# Patient Record
Sex: FEMALE | Race: WHITE | NOT HISPANIC OR LATINO | URBAN - METROPOLITAN AREA
[De-identification: names, ages, dates, MRNs, and addresses within clinical notes are randomized per-mention and may not be internally consistent; named-entity substitution may affect disease eponyms.]

---

## 2024-08-05 ENCOUNTER — OFFICE VISIT (OUTPATIENT)
Dept: DERMATOLOGY | Facility: IMAGING CENTER | Age: 69
End: 2024-08-05
Payer: MEDICARE

## 2024-08-05 DIAGNOSIS — L30.9 DERMATITIS: ICD-10-CM

## 2024-08-05 PROCEDURE — 11104 PUNCH BX SKIN SINGLE LESION: CPT | Performed by: STUDENT IN AN ORGANIZED HEALTH CARE EDUCATION/TRAINING PROGRAM

## 2024-08-05 PROCEDURE — 99203 OFFICE O/P NEW LOW 30 MIN: CPT | Mod: 25 | Performed by: STUDENT IN AN ORGANIZED HEALTH CARE EDUCATION/TRAINING PROGRAM

## 2024-08-05 NOTE — PROGRESS NOTES
[facility] Dermatology Clinic Note    CC: rash      HPI:  Kaylyn Canchola is a 69 y.o. female who presents today as new patient for evaluation and management of    Rash:   Notes pink bumps that popped up around March or April of this year, coming and going in different locations including face, shoulders, arms, legs. She has seen a few other dermatologists, was told to use topical steroids which didn't help. Then was told it might be eczema and recommended gentle skin care. This has not seemed to help. Spots are maybe slightly itchy, not significantly symptomatic. Doesn't take any other medications. Interested in holistic treatments.     Denies new symptoms like cough, fever, fatigue, other medical problems.       ROS: no fevers/chills. Other pertinent positives and negatives as above.     Meds/PMH/PSH/FamHx/Allergies:   I have reviewed past medical history, surgical history, medications family history, allergies relevant to my specialty in the chart.       PHYSICAL EXAM:   A focused skin exam was completed of the face, shoulders, back, chest, arms, legs with the following pertinent findings listed below.   Remaining above-listed examined areas within normal limits / negative for rashes or lesions.    Annular pink scaly plaques on the bilateral shoulders, dorsum of nose, buttocks, right leg          IMPRESSION / PLAN:    Rash NOS - ddx GA vs annular psoriasis vs EAC vs sarcoid vs other   - discussed that biopsy will give us further answers on etiology and treatment options.   - after discussion of risks benefits and alternatives, will proceed with with skin biopsy today, see procedure note  - will follow up with treatment recommendations pending biopsy results     Punch Biopsy Procedure Note:  Procedure: Biopsy by punch technique  Number of biopsies performed:   Size: 4mm   Location: shoulder   Differential diagnosis: as discussed above   Risks, benefits and alternatives of procedure discussed, verbal consent obtained  for photo (see chart) and written informed consent obtained for procedure. Time out completed. Area of biopsy prepped with alcohol. Anesthesia with 1% lidocaine with epinephrine administered intradermally with a 30 gauge needle. Punch biopsy of site performed. Hemostasis achieved with pressure and 4.0 prolene sutures. Vaseline applied to wound with bandage. Patient tolerated procedure well, and there were no complications.  The pathology specimen was sent to the lab via the staff.  Wound care was discussed with the patient. Please call clinic if post procedure complications such as bleeding at home, concern for infection.         Follow up:  pending biopsy results        Erica Gamez MD   [facility]

## 2024-08-08 ENCOUNTER — TELEPHONE (OUTPATIENT)
Dept: DERMATOLOGY | Facility: IMAGING CENTER | Age: 69
End: 2024-08-08
Payer: COMMERCIAL

## 2024-08-08 DIAGNOSIS — L92.0 GRANULOMA ANNULARE: ICD-10-CM

## 2024-08-08 NOTE — TELEPHONE ENCOUNTER
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     Options for treatment include topical steroids, intralesional steroids, NBUVB, systemic like hydroxychloroquine. Discussed risks and benefits of each. She is going to think about options and call back to office when she has decided.

## 2025-01-06 ENCOUNTER — OFFICE VISIT (OUTPATIENT)
Dept: DERMATOLOGY | Facility: IMAGING CENTER | Age: 70
End: 2025-01-06
Payer: MEDICARE

## 2025-01-06 DIAGNOSIS — L57.0 ACTINIC KERATOSIS: ICD-10-CM

## 2025-01-06 DIAGNOSIS — Z12.83 SKIN CANCER SCREENING: ICD-10-CM

## 2025-01-06 DIAGNOSIS — D22.9 MULTIPLE BENIGN NEVI: ICD-10-CM

## 2025-01-06 DIAGNOSIS — L82.1 SEBORRHEIC KERATOSES: ICD-10-CM

## 2025-01-06 DIAGNOSIS — L81.4 LENTIGO: ICD-10-CM

## 2025-01-06 PROCEDURE — 99213 OFFICE O/P EST LOW 20 MIN: CPT | Mod: 25 | Performed by: STUDENT IN AN ORGANIZED HEALTH CARE EDUCATION/TRAINING PROGRAM

## 2025-01-06 PROCEDURE — 17003 DESTRUCT PREMALG LES 2-14: CPT | Performed by: STUDENT IN AN ORGANIZED HEALTH CARE EDUCATION/TRAINING PROGRAM

## 2025-01-06 PROCEDURE — 17000 DESTRUCT PREMALG LESION: CPT | Performed by: STUDENT IN AN ORGANIZED HEALTH CARE EDUCATION/TRAINING PROGRAM

## 2025-01-06 NOTE — PROGRESS NOTES
[facility] DERMATOLOGY CLINIC NOTE    Chief Complaint   Patient presents with    Follow-Up     PAMELA        HPI:    Kaylyn Canchola is a 69 y.o. female here for evaluation of above, PAMELA.     Today notes following skin lesions of concern:   - red flaky spots on right nasal dorsum, comes and goes for many months      No other symptomatic (itching, painful, burning) or changing lesions.       Dermatology History:        - Prior history of skin cancer: none, has had things frozen in past        Review of Systems: No fevers, chill. Pertinent positives and negatives above.       Medications, Medical History, Surgical History, Family History & Allergies:  Reviewed in the chart, relevant history noted above.         PHYSICAL EXAM, ASSESSMENT, & PLAN (per problem):   A total body skin exam was performed including the following areas: head (including face), neck, chest, abdomen, groin/buttocks (excluding genitals), back, bilateral upper extremities, and bilateral lower extremities with the following pertinent findings in assessment/plan.      Actinic Keratosis  Exam: Gritty pink papules on right nasal dorsum, left antihelix superior      Pre-cancerous nature of lesions and potential for progression to SCC if left untreated over time discussed.  Patient elects to proceed with LN2 destructive treatment today of largest/thickest lesions. Risks (including, but not limited to: hypo or hyperpigmentation, redness, blister, scar, recurrence) and benefits of cryotherapy discussed. PROCEDURE NOTE: 2 cryotherapy cycles x 10 seconds applied to 2 lesion/s in location as specified above. Aftercare instructions discussed.   If lesions do not heal or continue to grow, crust, bleed, etc, please call office sooner for re-evaluation.          Benign appearing melanocytic nevi   Exam: medium brown macules scattered on trunk and extremities consistent with junctional nevi    - Discussed nevi are benign appearing on exam, symmetrical and regular pigment  network on dermoscopy   - ABCDEs of melanoma discussed, call office for sooner evaluation for concerning changes/growth      Lentigos / lentigines   Exam: scattered light to medium brown macules over photodistributed areas including forearms and shoulders    - benign, reassurance   - ABCDEs of melanoma discussed       Seborrheic keratosis   Exam: scattered tan to brown verrucous papules and plaques on trunk and extremities     benign, reassurance         Skin Cancer Prevention Counseling   Advise regular sun protection/sunscreen use, SPF 30 or greater with broad spectrum coverage need for reapplication every  minutes.   Recommend broad brimmed hats, UPF sun protective clothing when outdoors for extended periods of time   Recommend self checks at home,  ABCDEs of melanoma discussed   Handouts provided at visit         Follow up: in 1 year for PAMELA, sooner as needed       Erica Gamez MD   [facility]

## 2025-08-29 ENCOUNTER — APPOINTMENT (OUTPATIENT)
Dept: DERMATOLOGY | Facility: IMAGING CENTER | Age: 70
End: 2025-08-29
Payer: MEDICARE

## 2025-08-29 DIAGNOSIS — L92.0 GRANULOMA ANNULARE: ICD-10-CM

## 2025-08-29 DIAGNOSIS — Z12.83 SKIN CANCER SCREENING: Primary | ICD-10-CM

## 2025-08-29 DIAGNOSIS — L81.4 LENTIGO: ICD-10-CM

## 2025-08-29 DIAGNOSIS — D22.9 MULTIPLE BENIGN NEVI: ICD-10-CM

## 2025-08-29 DIAGNOSIS — L90.8 SKIN AGING: ICD-10-CM

## 2025-08-29 DIAGNOSIS — L82.1 SEBORRHEIC KERATOSES: ICD-10-CM
